# Patient Record
Sex: FEMALE | Race: BLACK OR AFRICAN AMERICAN | Employment: UNEMPLOYED | URBAN - METROPOLITAN AREA
[De-identification: names, ages, dates, MRNs, and addresses within clinical notes are randomized per-mention and may not be internally consistent; named-entity substitution may affect disease eponyms.]

---
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
## 2022-01-29 ENCOUNTER — HEALTH MAINTENANCE LETTER (OUTPATIENT)
Age: 22
End: 2022-01-29

## 2022-06-27 ENCOUNTER — TELEPHONE (OUTPATIENT)
Dept: PLASTIC SURGERY | Facility: CLINIC | Age: 22
End: 2022-06-27

## 2022-06-27 NOTE — TELEPHONE ENCOUNTER
Voicemail from patient.    Would like to schedule follow up appointment with Lyudmila.   Had surgery one year ago, and has questions/concerns about scarring and healing.    Routing to clinic.

## 2022-07-19 ENCOUNTER — OFFICE VISIT (OUTPATIENT)
Dept: PLASTIC SURGERY | Facility: CLINIC | Age: 22
End: 2022-07-19
Attending: PLASTIC SURGERY
Payer: COMMERCIAL

## 2022-07-19 VITALS
WEIGHT: 145.6 LBS | SYSTOLIC BLOOD PRESSURE: 109 MMHG | OXYGEN SATURATION: 96 % | RESPIRATION RATE: 18 BRPM | HEART RATE: 66 BPM | DIASTOLIC BLOOD PRESSURE: 71 MMHG | TEMPERATURE: 98.3 F | BODY MASS INDEX: 24.99 KG/M2

## 2022-07-19 DIAGNOSIS — N62 HYPERTROPHY OF BREAST: Primary | ICD-10-CM

## 2022-07-19 DIAGNOSIS — L91.0 KELOID SCAR: ICD-10-CM

## 2022-07-19 PROCEDURE — G0463 HOSPITAL OUTPT CLINIC VISIT: HCPCS

## 2022-07-19 PROCEDURE — 99214 OFFICE O/P EST MOD 30 MIN: CPT | Performed by: PLASTIC SURGERY

## 2022-07-19 ASSESSMENT — PAIN SCALES - GENERAL: PAINLEVEL: NO PAIN (0)

## 2022-07-19 NOTE — LETTER
7/19/2022         RE: Adrienne Ledezma  [address]  [place] [address]        Dear Colleague,    Thank you for referring your patient, Adrienne Ledezma, to the [facility]. Please see a copy of my visit note below.    PRESENTING COMPLAINT:  Postoperative visit status post bilateral breast reduction done in 05/2021.    HISTORY OF PRESENTING COMPLAINT:  Ms. Ledezma is 21 years old.  She is 2 just over a year out from her surgery.  Overall, happy with the results except she has some keloids in the inframammary fold that are very itchy.  She is here to have it looked at.  Additionally, she has a small skin lesion on her right inferior pole that developed about a week or so ago.    PHYSICAL EXAMINATION:  Vital signs stable.  She is afebrile, in no obvious distress.  Both breasts are healed.  She has some bottoming out of her breasts bilaterally, right worse than left.  She has a small dry scab just lateral to the vertical incision in the right lower pole.  No evidence of infection.  She has keloids in the inframammary fold areas bilaterally.    ASSESSMENT AND PLAN:  Based on above findings, a diagnosis of bilateral breast reduction was made.  She has symptomatic keloids with the itching.  She has a small skin lesion on the right breast that is only a week or so old.  My advice for that is to just follow it for the next few weeks.  If it does not go away, then biopsy it.  With regards to the keloids, my advice is either a steroid injection or excision with steroid injection with the hopes of helping her symptoms and hopefully prevent further recurrence.  However, I cannot guarantee that as a lot of keloids are genetic; therefore, there is a chance it could recur.  The steroid injection could lead to hypopigmentation, scar atrophy, skin atrophy, fat atrophy and telangiectasias.  She wants to think about it and I will proceed as she sees fit.  All questions were answered.  All  exam and discussion done in presence of my nurse, Betty Bergman.  I will see her back in about 3-4 weeks to ensure that the skin lesion has gone.    Total time spent with chart review, the visit itself and post-visit paperwork was 30 minutes.        Again, thank you for allowing me to participate in the care of your patient.      Sincerely,    ROMÁN Coreas MD

## 2022-07-19 NOTE — NURSING NOTE
"Oncology Rooming Note    July 19, 2022 10:36 AM   Adrienne Ledezma is a 21 year old female who presents for:    Chief Complaint   Patient presents with     Oncology Clinic Visit     Hypertrophy of breast-follow up from healing concerns     Initial Vitals: /71 (BP Location: Right arm, Patient Position: Sitting, Cuff Size: Adult Regular)   Pulse 66   Temp 98.3  F (36.8  C) (Oral)   Resp 18   Wt 66 kg (145 lb 9.6 oz)   SpO2 96%   BMI 24.99 kg/m   Estimated body mass index is 24.99 kg/m  as calculated from the following:    Height as of 6/1/21: 1.626 m (5' 4\").    Weight as of this encounter: 66 kg (145 lb 9.6 oz). Body surface area is 1.73 meters squared.  No Pain (0) Comment: Data Unavailable   No LMP recorded. (Menstrual status: Irregular Periods).  Allergies reviewed: Yes  Medications reviewed: Yes    Medications: Medication refills not needed today.  Pharmacy name entered into [facility]:    [id number] - [place], [place] - [address]  [id number] - [place], [place] - [address]    Clinical concerns: Reports occasion-intermittent bilateral breast/chest pain, tightening.        Libby Donaldson LPN July 19, 2022 10:38 AM              "

## 2022-07-19 NOTE — PROGRESS NOTES
PRESENTING COMPLAINT:  Postoperative visit status post bilateral breast reduction done in 05/2021.    HISTORY OF PRESENTING COMPLAINT:  Ms. Ledezma is 21 years old.  She is 2 just over a year out from her surgery.  Overall, happy with the results except she has some keloids in the inframammary fold that are very itchy.  She is here to have it looked at.  Additionally, she has a small skin lesion on her right inferior pole that developed about a week or so ago.    PHYSICAL EXAMINATION:  Vital signs stable.  She is afebrile, in no obvious distress.  Both breasts are healed.  She has some bottoming out of her breasts bilaterally, right worse than left.  She has a small dry scab just lateral to the vertical incision in the right lower pole.  No evidence of infection.  She has keloids in the inframammary fold areas bilaterally.    ASSESSMENT AND PLAN:  Based on above findings, a diagnosis of bilateral breast reduction was made.  She has symptomatic keloids with the itching.  She has a small skin lesion on the right breast that is only a week or so old.  My advice for that is to just follow it for the next few weeks.  If it does not go away, then biopsy it.  With regards to the keloids, my advice is either a steroid injection or excision with steroid injection with the hopes of helping her symptoms and hopefully prevent further recurrence.  However, I cannot guarantee that as a lot of keloids are genetic; therefore, there is a chance it could recur.  The steroid injection could lead to hypopigmentation, scar atrophy, skin atrophy, fat atrophy and telangiectasias.  She wants to think about it and I will proceed as she sees fit.  All questions were answered.  All exam and discussion done in presence of my nurse, Betty Bergman.  I will see her back in about 3-4 weeks to ensure that the skin lesion has gone.    Total time spent with chart review, the visit itself and post-visit paperwork was 30 minutes.

## 2022-09-18 ENCOUNTER — HEALTH MAINTENANCE LETTER (OUTPATIENT)
Age: 22
End: 2022-09-18

## 2023-05-07 ENCOUNTER — HEALTH MAINTENANCE LETTER (OUTPATIENT)
Age: 23
End: 2023-05-07

## 2023-08-17 ENCOUNTER — OFFICE VISIT (OUTPATIENT)
Dept: URGENT CARE | Facility: URGENT CARE | Age: 23
End: 2023-08-17
Payer: COMMERCIAL

## 2023-08-17 VITALS
RESPIRATION RATE: 16 BRPM | TEMPERATURE: 98.1 F | BODY MASS INDEX: 23.7 KG/M2 | OXYGEN SATURATION: 100 % | HEART RATE: 68 BPM | WEIGHT: 138.1 LBS | DIASTOLIC BLOOD PRESSURE: 79 MMHG | SYSTOLIC BLOOD PRESSURE: 117 MMHG

## 2023-08-17 DIAGNOSIS — L03.011 HANGNAIL OF DIGIT OF RIGHT HAND: Primary | ICD-10-CM

## 2023-08-17 PROCEDURE — 99213 OFFICE O/P EST LOW 20 MIN: CPT | Performed by: PHYSICIAN ASSISTANT

## 2023-08-17 ASSESSMENT — ENCOUNTER SYMPTOMS
DIZZINESS: 0
CARDIOVASCULAR NEGATIVE: 1
PALPITATIONS: 0
WEAKNESS: 0
MYALGIAS: 0
NAUSEA: 0
ALLERGIC/IMMUNOLOGIC NEGATIVE: 1
MUSCULOSKELETAL NEGATIVE: 1
EYES NEGATIVE: 1
JOINT SWELLING: 0
FEVER: 0
ENDOCRINE NEGATIVE: 1
VOMITING: 0
LIGHT-HEADEDNESS: 0
NECK PAIN: 0
NECK STIFFNESS: 0
CHILLS: 0
SORE THROAT: 0
ARTHRALGIAS: 0
DIARRHEA: 0
RHINORRHEA: 0
BACK PAIN: 0
HEADACHES: 0
WOUND: 1
COUGH: 0
SHORTNESS OF BREATH: 0
RESPIRATORY NEGATIVE: 1

## 2023-08-17 ASSESSMENT — PAIN SCALES - GENERAL: PAINLEVEL: SEVERE PAIN (6)

## 2023-08-17 NOTE — PROGRESS NOTES
Chief Complaint:    Chief Complaint   Patient presents with    Infection     Patient believes she has an infection on right ring finger from biting nails.      Medical Decision Making:    Vital signs reviewed by Mark Guerrero PA-C  /79 (BP Location: Left arm, Patient Position: Sitting, Cuff Size: Adult Regular)   Pulse 68   Temp 98.1  F (36.7  C) (Tympanic)   Resp 16   Wt 62.6 kg (138 lb 1.6 oz)   SpO2 100%   BMI 23.70 kg/m      Differential Diagnosis:  Paronychia, cellulitis     ASSESSMENT:     1. Hangnail of digit of right hand       PLAN:     Hang nail removed with splinter forceps.    Start finger soaks.  Patient instructed to follow up with PCP in 1 week if symptoms are not improving.  Sooner if symptoms worsen.  Worrisome symptoms discussed with instructions to go to the ED.  Patient verbalized understanding and agreed with this plan.    Labs:     No results found for any visits on 08/17/23.    Current Meds:    Current Outpatient Medications:     ondansetron (ZOFRAN-ODT) 4 MG ODT tab, Take 1-2 tablets (4-8 mg) by mouth every 8 hours as needed for nausea (Patient not taking: No sig reported), Disp: 4 tablet, Rfl: 0    oxyCODONE (ROXICODONE) 5 MG tablet, Take 1-2 tablets (5-10 mg) by mouth every 6 hours as needed for moderate to severe pain (Patient not taking: No sig reported), Disp: 20 tablet, Rfl: 0    senna-docusate (SENOKOT-S/PERICOLACE) 8.6-50 MG tablet, Take 1-2 tablets by mouth 2 times daily (Patient not taking: No sig reported), Disp: 30 tablet, Rfl: 0    Allergies:  No Known Allergies    SUBJECTIVE    HPI: Adrienne Ledezma is an 22 year old female who presents for evaluation and treatment of possible finger infection.  Patient bits her finger and is concerned about infection in her R ring finger.  She has noticed some pain in the distal finger. No swelling, or discharge.     ROS:      Review of Systems   Constitutional:  Negative for chills and fever.   HENT:  Negative for congestion, ear  pain, rhinorrhea and sore throat.    Eyes: Negative.    Respiratory: Negative.  Negative for cough and shortness of breath.    Cardiovascular: Negative.  Negative for chest pain and palpitations.   Gastrointestinal:  Negative for diarrhea, nausea and vomiting.   Endocrine: Negative.    Genitourinary: Negative.    Musculoskeletal: Negative.  Negative for arthralgias, back pain, joint swelling, myalgias, neck pain and neck stiffness.   Skin:  Positive for wound. Negative for rash.   Allergic/Immunologic: Negative.  Negative for immunocompromised state.   Neurological:  Negative for dizziness, weakness, light-headedness and headaches.        Family History   Family History   Problem Relation Age of Onset    Diabetes Father     Hypertension Father     Anesthesia Reaction No family hx of     Cardiovascular No family hx of     Deep Vein Thrombosis (DVT) No family hx of        Social History  Social History     Socioeconomic History    Marital status: Single     Spouse name: Not on file    Number of children: Not on file    Years of education: Not on file    Highest education level: Not on file   Occupational History    Not on file   Tobacco Use    Smoking status: Never     Passive exposure: Past    Smokeless tobacco: Never   Substance and Sexual Activity    Alcohol use: Not on file    Drug use: Not on file    Sexual activity: Not on file   Other Topics Concern    Not on file   Social History Narrative    Not on file     Social Determinants of Health     Financial Resource Strain: Not on file   Food Insecurity: Not on file   Transportation Needs: Not on file   Physical Activity: Not on file   Stress: Not on file   Social Connections: Not on file   Intimate Partner Violence: Not on file   Housing Stability: Not on file        Surgical History:  Past Surgical History:   Procedure Laterality Date    MAMMOPLASTY REDUCTION BILATERAL Bilateral 5/20/2021    Procedure: MAMMOPLASTY, REDUCTION, BILATERAL;  Surgeon: ROMÁN Coreas  MD Brian;  Location: [facility] OR        Problem List:  Patient Active Problem List   Diagnosis    Slow transit constipation    Closed nondisplaced fracture of lateral malleolus of right fibula    Upper back pain, chronic    Hypertrophy of breast           OBJECTIVE:     Vital signs noted and reviewed by Mark Guerrero PA-C  /79 (BP Location: Left arm, Patient Position: Sitting, Cuff Size: Adult Regular)   Pulse 68   Temp 98.1  F (36.7  C) (Tympanic)   Resp 16   Wt 62.6 kg (138 lb 1.6 oz)   SpO2 100%   BMI 23.70 kg/m       PEFR:    Physical Exam  Vitals and nursing note reviewed.   Constitutional:       General: She is not in acute distress.     Appearance: She is well-developed. She is not ill-appearing, toxic-appearing or diaphoretic.   HENT:      Head: Normocephalic and atraumatic.      Right Ear: Tympanic membrane and external ear normal. No drainage, swelling or tenderness. Tympanic membrane is not perforated, erythematous, retracted or bulging.      Left Ear: Tympanic membrane and external ear normal. No drainage, swelling or tenderness. Tympanic membrane is not perforated, erythematous, retracted or bulging.      Nose: No mucosal edema, congestion or rhinorrhea.      Right Sinus: No maxillary sinus tenderness or frontal sinus tenderness.      Left Sinus: No maxillary sinus tenderness or frontal sinus tenderness.      Mouth/Throat:      Pharynx: No pharyngeal swelling, oropharyngeal exudate, posterior oropharyngeal erythema or uvula swelling.      Tonsils: No tonsillar abscesses.   Eyes:      Pupils: Pupils are equal, round, and reactive to light.   Neck:      Trachea: Trachea normal.   Cardiovascular:      Rate and Rhythm: Normal rate and regular rhythm.      Heart sounds: Normal heart sounds, S1 normal and S2 normal. No murmur heard.     No friction rub. No gallop.   Pulmonary:      Effort: Pulmonary effort is normal. No respiratory distress.      Breath sounds: Normal breath sounds. No decreased  breath sounds, wheezing, rhonchi or rales.   Abdominal:      General: Bowel sounds are normal. There is no distension.      Palpations: Abdomen is soft. Abdomen is not rigid. There is no mass.      Tenderness: There is no abdominal tenderness. There is no guarding or rebound.   Musculoskeletal:      Cervical back: Full passive range of motion without pain, normal range of motion and neck supple.      Comments: Hang nail on R ring finger.   Lymphadenopathy:      Cervical: No cervical adenopathy.   Skin:     General: Skin is warm and dry.   Neurological:      Mental Status: She is alert and oriented to person, place, and time.      Cranial Nerves: No cranial nerve deficit.      Deep Tendon Reflexes: Reflexes are normal and symmetric.   Psychiatric:         Behavior: Behavior normal. Behavior is cooperative.         Thought Content: Thought content normal.         Judgment: Judgment normal.             Mark Guerrero PA-C  8/17/2023, 4:43 PM

## 2024-07-14 ENCOUNTER — HEALTH MAINTENANCE LETTER (OUTPATIENT)
Age: 24
End: 2024-07-14

## 2024-11-25 ENCOUNTER — ANCILLARY PROCEDURE (OUTPATIENT)
Dept: GENERAL RADIOLOGY | Facility: CLINIC | Age: 24
End: 2024-11-25
Payer: COMMERCIAL

## 2024-11-25 ENCOUNTER — OFFICE VISIT (OUTPATIENT)
Dept: URGENT CARE | Facility: URGENT CARE | Age: 24
End: 2024-11-25
Payer: COMMERCIAL

## 2024-11-25 VITALS
OXYGEN SATURATION: 100 % | BODY MASS INDEX: 24.72 KG/M2 | HEART RATE: 71 BPM | WEIGHT: 144 LBS | SYSTOLIC BLOOD PRESSURE: 122 MMHG | DIASTOLIC BLOOD PRESSURE: 82 MMHG | RESPIRATION RATE: 16 BRPM | TEMPERATURE: 97.9 F

## 2024-11-25 DIAGNOSIS — R10.12 LUQ ABDOMINAL PAIN: Primary | ICD-10-CM

## 2024-11-25 PROCEDURE — 74019 RADEX ABDOMEN 2 VIEWS: CPT | Mod: TC | Performed by: RADIOLOGY

## 2024-11-25 PROCEDURE — 99203 OFFICE O/P NEW LOW 30 MIN: CPT

## 2024-11-26 NOTE — PATIENT INSTRUCTIONS
Abdominal x-ray shows the following per the radiologist report: No distended air-filled loops of small bowel. No renal or ureteral calculi appreciated.  You can take Tylenol as needed for pain with the maximum dose being 4000 mg in a 24-hour period of time.  You can use ice/heat to the area as well for pain.  Go to the emergency department with any new or worsening symptoms.

## 2024-11-26 NOTE — PROGRESS NOTES
"ASSESSMENT:  (R10.12) LUQ abdominal pain  (primary encounter diagnosis)  Plan: XR Abdomen 2 Views    PLAN:  Informed the patient that the abdominal x-ray shows the following per the radiologist report: No distended air-filled loops of small bowel. No renal or ureteral calculi appreciated.  We discussed taking Tylenol as needed for pain with the maximum dose being 4000 mg in a 24-hour period of time.  We also discussed using ice/heat to the area for pain.  Instructed the patient to go to the emergency department with any new or worsening symptoms.  Patient acknowledged her understanding of the above plan.    The use of Dragon/PowerMic dictation services may have been used to construct the content in this note; any grammatical or spelling errors are non-intentional. Please contact the author of this note directly if you are in need of any clarification.      Robin Graves, APRN CNP  [facility]    SUBJECTIVE:   Adrienne Ledezma is a 24 year old female with symptoms of left flank pain which began 1 week ago.    Symptoms are gradual onset and moderate to severe.    Aggravating factors: coughing and laughing.    Alleviating factors:rest  Associated symptoms:  Described as: sharp   OTC medication: Tiger balm  Fever: no noted fevers  Stools: smaller bowel movements recently  Appetite: \"always irregular\"  Risk factors: none    First day of LMP was end of last month.     ROS:  Negative except noted above.     OBJECTIVE:   /82 (BP Location: Left arm, Patient Position: Sitting, Cuff Size: Adult Regular)   Pulse 71   Temp 97.9  F (36.6  C) (Tympanic)   Resp 16   Wt 65.3 kg (144 lb)   SpO2 100%   BMI 24.72 kg/m    GENERAL APPEARANCE: healthy, alert and no distress  EYES: EOMI,  PERRL, conjunctiva clear  HENT: nose and mouth without erythema, ulcers or lesions and oral mucous membranes moist, no erythema noted  NECK: supple, nontender, no lymphadenopathy  RESP: lungs clear to " auscultation - no rales, rhonchi or wheezes  CV: regular rates and rhythm, normal S1 S2, no murmur noted  ABDOMEN: soft, normal bowel sounds, tenderness mild LUQ  SKIN: no suspicious lesions or rashes

## 2025-07-19 ENCOUNTER — HEALTH MAINTENANCE LETTER (OUTPATIENT)
Age: 25
End: 2025-07-19

## (undated) DEVICE — SOL WATER IRRIG 500ML BOTTLE 2F7113

## (undated) DEVICE — GLOVE PROTEXIS W/NEU-THERA 7.0  2D73TE70

## (undated) DEVICE — PEN MARKING SKIN W/LABELS 31145884

## (undated) DEVICE — SUCTION TIP YANKAUER W/O VENT K86

## (undated) DEVICE — STPL SKIN 35W 059037

## (undated) DEVICE — PREP CHLORAPREP 26ML TINTED ORANGE  260815

## (undated) DEVICE — SUCTION MANIFOLD NEPTUNE 2 SYS 1 PORT 702-025-000

## (undated) DEVICE — BLADE KNIFE SURG 10 371110

## (undated) DEVICE — SOL NACL 0.9% IRRIG 500ML BOTTLE 2F7123

## (undated) DEVICE — DRAPE U SPLIT 74X120" 29440

## (undated) DEVICE — PEN MARKING SKIN W/PAPER RULER 31145785

## (undated) DEVICE — PACK MINOR CUSTOM ASC

## (undated) DEVICE — PAD ARMBOARD FOAM EGGCRATE 50676-378

## (undated) DEVICE — DRAPE IOBAN INCISE 23X17" 6650EZ

## (undated) DEVICE — LINEN TOWEL PACK X5 5464

## (undated) DEVICE — PAD CHUX UNDERPAD 30X30"

## (undated) DEVICE — DRSG KERLIX 4 1/2"X4YDS ROLL 6730

## (undated) DEVICE — ESU PENCIL SMOKE EVAC W/ROCKER SWITCH 0703-047-000

## (undated) DEVICE — SPONGE LAP 18X18" X8435

## (undated) DEVICE — ESU GROUND PAD ADULT W/CORD E7507

## (undated) DEVICE — ESU ELEC BLADE HEX-LOCKING 2.5" E1450X

## (undated) RX ORDER — FENTANYL CITRATE 50 UG/ML
INJECTION, SOLUTION INTRAMUSCULAR; INTRAVENOUS
Status: DISPENSED
Start: 2021-05-20

## (undated) RX ORDER — ONDANSETRON 2 MG/ML
INJECTION INTRAMUSCULAR; INTRAVENOUS
Status: DISPENSED
Start: 2021-05-20

## (undated) RX ORDER — HYDROMORPHONE HYDROCHLORIDE 1 MG/ML
INJECTION, SOLUTION INTRAMUSCULAR; INTRAVENOUS; SUBCUTANEOUS
Status: DISPENSED
Start: 2021-05-20

## (undated) RX ORDER — ACETAMINOPHEN 325 MG/1
TABLET ORAL
Status: DISPENSED
Start: 2021-05-20

## (undated) RX ORDER — LIDOCAINE HYDROCHLORIDE 20 MG/ML
INJECTION, SOLUTION EPIDURAL; INFILTRATION; INTRACAUDAL; PERINEURAL
Status: DISPENSED
Start: 2021-05-20

## (undated) RX ORDER — DEXAMETHASONE SODIUM PHOSPHATE 4 MG/ML
INJECTION, SOLUTION INTRA-ARTICULAR; INTRALESIONAL; INTRAMUSCULAR; INTRAVENOUS; SOFT TISSUE
Status: DISPENSED
Start: 2021-05-20

## (undated) RX ORDER — CEFAZOLIN SODIUM 2 G/50ML
SOLUTION INTRAVENOUS
Status: DISPENSED
Start: 2021-05-20

## (undated) RX ORDER — GLYCOPYRROLATE 0.2 MG/ML
INJECTION INTRAMUSCULAR; INTRAVENOUS
Status: DISPENSED
Start: 2021-05-20

## (undated) RX ORDER — EPHEDRINE SULFATE 50 MG/ML
INJECTION, SOLUTION INTRAMUSCULAR; INTRAVENOUS; SUBCUTANEOUS
Status: DISPENSED
Start: 2021-05-20

## (undated) RX ORDER — MINERAL OIL
OIL (ML) MISCELLANEOUS
Status: DISPENSED
Start: 2021-05-20

## (undated) RX ORDER — PROPOFOL 10 MG/ML
INJECTION, EMULSION INTRAVENOUS
Status: DISPENSED
Start: 2021-05-20

## (undated) RX ORDER — KETOROLAC TROMETHAMINE 30 MG/ML
INJECTION, SOLUTION INTRAMUSCULAR; INTRAVENOUS
Status: DISPENSED
Start: 2021-05-20

## (undated) RX ORDER — GABAPENTIN 300 MG/1
CAPSULE ORAL
Status: DISPENSED
Start: 2021-05-20

## (undated) RX ORDER — OXYCODONE HYDROCHLORIDE 5 MG/1
TABLET ORAL
Status: DISPENSED
Start: 2021-05-20